# Patient Record
Sex: MALE | Race: WHITE | NOT HISPANIC OR LATINO | URBAN - METROPOLITAN AREA
[De-identification: names, ages, dates, MRNs, and addresses within clinical notes are randomized per-mention and may not be internally consistent; named-entity substitution may affect disease eponyms.]

---

## 2017-06-13 ENCOUNTER — OUTPATIENT (OUTPATIENT)
Dept: OUTPATIENT SERVICES | Facility: HOSPITAL | Age: 77
LOS: 1 days | End: 2017-06-13

## 2018-11-27 ENCOUNTER — OUTPATIENT (OUTPATIENT)
Dept: OUTPATIENT SERVICES | Facility: HOSPITAL | Age: 78
LOS: 1 days | End: 2018-11-27

## 2019-09-05 PROBLEM — Z00.00 ENCOUNTER FOR PREVENTIVE HEALTH EXAMINATION: Status: ACTIVE | Noted: 2019-09-05

## 2019-09-06 ENCOUNTER — APPOINTMENT (OUTPATIENT)
Dept: NEUROSURGERY | Facility: CLINIC | Age: 79
End: 2019-09-06
Payer: MEDICARE

## 2019-09-06 VITALS
HEIGHT: 70 IN | SYSTOLIC BLOOD PRESSURE: 129 MMHG | HEART RATE: 95 BPM | WEIGHT: 195 LBS | BODY MASS INDEX: 27.92 KG/M2 | DIASTOLIC BLOOD PRESSURE: 88 MMHG

## 2019-09-06 DIAGNOSIS — M54.16 RADICULOPATHY, LUMBAR REGION: ICD-10-CM

## 2019-09-06 DIAGNOSIS — I82.409 ACUTE EMBOLISM AND THROMBOSIS OF UNSPECIFIED DEEP VEINS OF UNSPECIFIED LOWER EXTREMITY: ICD-10-CM

## 2019-09-06 DIAGNOSIS — M48.062 SPINAL STENOSIS, LUMBAR REGION WITH NEUROGENIC CLAUDICATION: ICD-10-CM

## 2019-09-06 PROCEDURE — 99204 OFFICE O/P NEW MOD 45 MIN: CPT

## 2019-09-06 RX ORDER — RIVAROXABAN 20 MG/1
20 TABLET, FILM COATED ORAL
Refills: 0 | Status: ACTIVE | COMMUNITY

## 2019-09-06 RX ORDER — ROSUVASTATIN CALCIUM 10 MG/1
10 TABLET, FILM COATED ORAL
Refills: 0 | Status: ACTIVE | COMMUNITY

## 2019-09-06 RX ORDER — OXYCODONE AND ACETAMINOPHEN 10; 325 MG/1; MG/1
10-325 TABLET ORAL
Qty: 20 | Refills: 0 | Status: ACTIVE | COMMUNITY
Start: 2019-09-06 | End: 1900-01-01

## 2019-09-06 RX ORDER — LISINOPRIL 10 MG/1
10 TABLET ORAL
Refills: 0 | Status: ACTIVE | COMMUNITY

## 2019-09-06 NOTE — RESULTS/DATA
[FreeTextEntry1] : \par [facility] imaging reveals spondylolisthesis at L4-5 and stenosis L3-4-4 5 with mild/moderate stenosis at L2-3 and 3 for

## 2019-09-06 NOTE — PLAN
[FreeTextEntry1] : DIAGNOSIS:    LUMBAR SPONDYLOSIS/STENOSIS     DISK DEGENERATION\par TREATMENT PLAN:  NON-SURGICAL\par \par This is a patient with degenerated lumbar disks with associated stenosis and spondylosis.  I have recommended nonsurgical management at this time.  This consists of physical therapy and/or manual medicine in conjunction to medical therapy and other conservative methods.  These include the consideration of trigger point injections and or the utilization of modalities such as TENS where applicable.  The next tier would be the referral to a pain management specialist (anesthesia or physiatry) for the consideration of spinal injections.  This includes the options of epidural steroids, facet injections as well as other novel techniques that may provide pain relief.  Although there is indeed evidence of disk degeneration on imaging, discectomy or spinal fusion for the sole purposes of treating back/leg pain in the absence of a compressive lesion or neurological issue is associated with poor outcomes.   \par \par Additionally he is on anticoagulation, he has a history of valve replacement, and had a fairly adverse event with the steroid administration. I believe medically he would be a poor surgical candidate overall.\par \par I have reviewed the images in detail with the patient today in my office and have answered all questions regarding this condition to the best of my ability to the patient’s satisfaction.\par

## 2019-09-06 NOTE — REASON FOR VISIT
[New Patient Visit] : a new patient visit [FreeTextEntry1] : severe lower back pain- [facility].

## 2019-09-06 NOTE — HISTORY OF PRESENT ILLNESS
[de-identified] : \par Robert is a pleasant 79-year-old here for evaluation of his lumbar spine. States a history of severe pain starting in about May. Prior that he's been very active and he is still employed as a microscope technician. He traveled to [place] in January as a matter of fact by his report. At this point he has excruciating pain and is unable to walk without the use of a cane. He started with some therapy and ended pain management and green port which unfortunately only yielded him significant side effects increased pain. 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 Thereafter he tried to remain active working in February for a long car ride and ended up with a DVT in the right calf. He is currently on Zarelto.  He has a history of valve replacement in the distant past and is on the care of cardiology as well as vascular surgery.\par \par Mart Molina  PMD\par Oscar BELTRAN    did not help    Cushingoid response   Last   July\par [facility]\par [facility] \par \par \par \par RIGHT   DVT   \par Zarelto\par \par \par

## 2019-09-06 NOTE — PHYSICAL EXAM
[Antalgic] : antalgic [Able to toe walk] : the patient was not able to toe walk [Able to heel walk] : the patient was not able to heel walk [Intact] : all sensory within normal limits bilaterally

## 2020-10-08 ENCOUNTER — OUTPATIENT (OUTPATIENT)
Dept: OUTPATIENT SERVICES | Facility: HOSPITAL | Age: 80
LOS: 1 days | End: 2020-10-08

## 2021-03-05 ENCOUNTER — OUTPATIENT (OUTPATIENT)
Dept: OUTPATIENT SERVICES | Facility: HOSPITAL | Age: 81
LOS: 1 days | End: 2021-03-05

## 2022-12-06 ENCOUNTER — OFFICE (OUTPATIENT)
Dept: URBAN - METROPOLITAN AREA CLINIC 9 | Facility: CLINIC | Age: 82
Setting detail: OPHTHALMOLOGY
End: 2022-12-06
Payer: MEDICARE

## 2022-12-06 ENCOUNTER — RX ONLY (RX ONLY)
Age: 82
End: 2022-12-06

## 2022-12-06 DIAGNOSIS — H16.223: ICD-10-CM

## 2022-12-06 DIAGNOSIS — H43.813: ICD-10-CM

## 2022-12-06 DIAGNOSIS — Z96.1: ICD-10-CM

## 2022-12-06 DIAGNOSIS — L71.0: ICD-10-CM

## 2022-12-06 DIAGNOSIS — H01.004: ICD-10-CM

## 2022-12-06 DIAGNOSIS — H04.213: ICD-10-CM

## 2022-12-06 DIAGNOSIS — H02.132: ICD-10-CM

## 2022-12-06 DIAGNOSIS — H11.153: ICD-10-CM

## 2022-12-06 DIAGNOSIS — H26.493: ICD-10-CM

## 2022-12-06 DIAGNOSIS — H01.001: ICD-10-CM

## 2022-12-06 DIAGNOSIS — H02.135: ICD-10-CM

## 2022-12-06 PROCEDURE — 99214 OFFICE O/P EST MOD 30 MIN: CPT | Performed by: OPHTHALMOLOGY

## 2022-12-06 ASSESSMENT — REFRACTION_AUTOREFRACTION
OD_AXIS: 015
OS_SPHERE: -0.25
OD_SPHERE: PLANO
OS_AXIS: 145
OD_CYLINDER: +0.75
OS_CYLINDER: +1.75

## 2022-12-06 ASSESSMENT — LID POSITION - ECTROPION
OD_ECTROPION: RLL 1+
OS_ECTROPION: LLL 2+ 3+

## 2022-12-06 ASSESSMENT — TEAR BREAK UP TIME (TBUT)
OD_TBUT: 2-3 SECS
OS_TBUT: 2-3 SECS

## 2022-12-06 ASSESSMENT — REFRACTION_MANIFEST
OD_CYLINDER: +0.50
OU_VA: 20/15-2
OD_AXIS: 015
OD_VA2: 20/20
OS_ADD: +2.50
OD_SPHERE: PLANO
OD_ADD: +2.50
OS_SPHERE: PLANO
OD_VA1: 20/20
OD_VA2: 20/20
OU_VA: 20/15-2
OD_AXIS: 015
OS_ADD: +2.50
OS_CYLINDER: +0.50
OS_VA2: 20/20
OS_VA1: 20/20-
OD_SPHERE: PLANO
OS_CYLINDER: +0.50
OD_ADD: +2.50
OS_VA1: 20/20-
OS_VA2: 20/20
OD_VA1: 20/20
OS_AXIS: 145
OS_SPHERE: PLANO
OD_CYLINDER: +0.50
OS_AXIS: 145

## 2022-12-06 ASSESSMENT — KERATOMETRY
OD_AXISANGLE_DEGREES: 054
OS_K1POWER_DIOPTERS: 40.75
OS_K2POWER_DIOPTERS: 41.50
METHOD_AUTO_MANUAL: AUTO
OD_K1POWER_DIOPTERS: 40.50
OS_AXISANGLE_DEGREES: 114
OD_K2POWER_DIOPTERS: 40.75

## 2022-12-06 ASSESSMENT — REFRACTION_CURRENTRX
OS_CYLINDER: +0.50
OD_ADD: +2.50
OD_CYLINDER: +0.50
OS_AXIS: 167
OS_VPRISM_DIRECTION: PROGS
OS_ADD: +2.50
OS_SPHERE: +0.25
OD_SPHERE: +0.25
OD_VPRISM_DIRECTION: PROGS
OD_AXIS: 014
OD_OVR_VA: 20/
OS_OVR_VA: 20/

## 2022-12-06 ASSESSMENT — SPHEQUIV_DERIVED: OS_SPHEQUIV: 0.625

## 2022-12-06 ASSESSMENT — LID EXAM ASSESSMENTS
OS_BLEPHARITIS: LUL 2+
OD_BLEPHARITIS: RUL 2+

## 2022-12-06 ASSESSMENT — VISUAL ACUITY
OD_BCVA: 20/20
OS_BCVA: 20/25-2

## 2022-12-06 ASSESSMENT — AXIALLENGTH_DERIVED: OS_AL: 24.238

## 2022-12-06 ASSESSMENT — CONFRONTATIONAL VISUAL FIELD TEST (CVF)
OS_FINDINGS: FULL
OD_FINDINGS: FULL

## 2022-12-16 ENCOUNTER — RX ONLY (RX ONLY)
Age: 82
End: 2022-12-16

## 2022-12-16 ENCOUNTER — OFFICE (OUTPATIENT)
Dept: URBAN - METROPOLITAN AREA CLINIC 38 | Facility: CLINIC | Age: 82
Setting detail: OPHTHALMOLOGY
End: 2022-12-16
Payer: MEDICARE

## 2022-12-16 DIAGNOSIS — H04.223: ICD-10-CM

## 2022-12-16 DIAGNOSIS — H02.135: ICD-10-CM

## 2022-12-16 DIAGNOSIS — H02.132: ICD-10-CM

## 2022-12-16 PROBLEM — H35.3131 AGE RELATED MACULAR DEGENERATION DRY; BOTH EYES EARLY: Status: ACTIVE | Noted: 2022-12-06

## 2022-12-16 PROCEDURE — 92285 EXTERNAL OCULAR PHOTOGRAPHY: CPT | Performed by: OPHTHALMOLOGY

## 2022-12-16 PROCEDURE — 99214 OFFICE O/P EST MOD 30 MIN: CPT | Performed by: OPHTHALMOLOGY

## 2022-12-16 PROCEDURE — 68840 EXPLORE/IRRIGATE TEAR DUCTS: CPT | Performed by: OPHTHALMOLOGY

## 2022-12-16 ASSESSMENT — PUNCTA - ASSESSMENT
OD_PUNCTA: LARGE
OS_PUNCTA: LARGE

## 2022-12-16 ASSESSMENT — KERATOMETRY
OD_AXISANGLE_DEGREES: 054
OS_K1POWER_DIOPTERS: 40.75
OS_AXISANGLE_DEGREES: 114
OS_K2POWER_DIOPTERS: 41.50
OD_K2POWER_DIOPTERS: 40.75
METHOD_AUTO_MANUAL: AUTO
OD_K1POWER_DIOPTERS: 40.50

## 2022-12-16 ASSESSMENT — REFRACTION_AUTOREFRACTION
OD_AXIS: 015
OS_CYLINDER: +1.75
OD_SPHERE: PLANO
OD_CYLINDER: +0.75
OS_AXIS: 145
OS_SPHERE: -0.25

## 2022-12-16 ASSESSMENT — LID POSITION - ECTROPION
OS_ECTROPION: LLL 2+ 3+
OD_ECTROPION: RLL 1+

## 2022-12-16 ASSESSMENT — SPHEQUIV_DERIVED: OS_SPHEQUIV: 0.625

## 2022-12-16 ASSESSMENT — LACRIMAL DUCT - ASSESSMENT
OD_LACRIMAL_DUCT: PASSAGEWAY OPEN UPON IRRIGATION
OS_LACRIMAL_DUCT: PASSAGEWAY OPEN UPON IRRIGATION

## 2022-12-16 ASSESSMENT — TEAR BREAK UP TIME (TBUT)
OS_TBUT: 2-3 SECS
OD_TBUT: 2-3 SECS

## 2022-12-16 ASSESSMENT — LID EXAM ASSESSMENTS
OS_BLEPHARITIS: LUL 2+
OD_BLEPHARITIS: RUL 2+

## 2022-12-16 ASSESSMENT — CONFRONTATIONAL VISUAL FIELD TEST (CVF)
OD_FINDINGS: FULL
OS_FINDINGS: FULL

## 2022-12-16 ASSESSMENT — VISUAL ACUITY
OS_BCVA: 20/25-2
OD_BCVA: 20/20

## 2022-12-16 ASSESSMENT — AXIALLENGTH_DERIVED: OS_AL: 24.238

## 2023-01-17 ENCOUNTER — RX ONLY (RX ONLY)
Age: 83
End: 2023-01-17

## 2023-01-17 ENCOUNTER — OFFICE (OUTPATIENT)
Dept: URBAN - METROPOLITAN AREA CLINIC 9 | Facility: CLINIC | Age: 83
Setting detail: OPHTHALMOLOGY
End: 2023-01-17
Payer: MEDICARE

## 2023-01-17 DIAGNOSIS — H26.492: ICD-10-CM

## 2023-01-17 PROCEDURE — 66821 AFTER CATARACT LASER SURGERY: CPT | Performed by: OPHTHALMOLOGY

## 2023-01-17 ASSESSMENT — REFRACTION_AUTOREFRACTION
OS_CYLINDER: +1.75
OD_CYLINDER: +0.75
OS_AXIS: 145
OD_AXIS: 015
OD_SPHERE: PLANO
OS_SPHERE: -0.25

## 2023-01-17 ASSESSMENT — SPHEQUIV_DERIVED: OS_SPHEQUIV: 0.625

## 2023-01-17 ASSESSMENT — AXIALLENGTH_DERIVED: OS_AL: 24.238

## 2023-01-17 ASSESSMENT — KERATOMETRY
OS_K1POWER_DIOPTERS: 40.75
OS_K2POWER_DIOPTERS: 41.50
OD_AXISANGLE_DEGREES: 054
METHOD_AUTO_MANUAL: AUTO
OS_AXISANGLE_DEGREES: 114
OD_K2POWER_DIOPTERS: 40.75
OD_K1POWER_DIOPTERS: 40.50

## 2023-01-17 ASSESSMENT — VISUAL ACUITY
OD_BCVA: 20/20
OS_BCVA: 20/25-2

## 2023-01-17 ASSESSMENT — TEAR BREAK UP TIME (TBUT)
OD_TBUT: 2-3 SECS
OS_TBUT: 2-3 SECS

## 2023-01-17 ASSESSMENT — CONFRONTATIONAL VISUAL FIELD TEST (CVF)
OD_FINDINGS: FULL
OS_FINDINGS: FULL

## 2023-01-17 ASSESSMENT — TONOMETRY: OS_IOP_MMHG: 08

## 2023-01-31 ENCOUNTER — OFFICE (OUTPATIENT)
Dept: URBAN - METROPOLITAN AREA CLINIC 9 | Facility: CLINIC | Age: 83
Setting detail: OPHTHALMOLOGY
End: 2023-01-31
Payer: MEDICARE

## 2023-01-31 DIAGNOSIS — H26.491: ICD-10-CM

## 2023-01-31 DIAGNOSIS — H11.32: ICD-10-CM

## 2023-01-31 PROBLEM — H11.153 PINGUECULA;  ,, BOTH EYES: Status: ACTIVE | Noted: 2023-01-31

## 2023-01-31 PROCEDURE — 99024 POSTOP FOLLOW-UP VISIT: CPT | Performed by: OPHTHALMOLOGY

## 2023-01-31 ASSESSMENT — LID POSITION - ECTROPION
OS_ECTROPION: LLL 2+ 3+
OD_ECTROPION: RLL 1+

## 2023-01-31 ASSESSMENT — REFRACTION_AUTOREFRACTION
OS_SPHERE: -0.25
OD_AXIS: 015
OD_SPHERE: PLANO
OD_CYLINDER: +0.75
OS_CYLINDER: +1.75
OS_AXIS: 145

## 2023-01-31 ASSESSMENT — CONFRONTATIONAL VISUAL FIELD TEST (CVF)
OS_FINDINGS: FULL
OD_FINDINGS: FULL

## 2023-01-31 ASSESSMENT — TEAR BREAK UP TIME (TBUT)
OD_TBUT: 2-3 SECS
OS_TBUT: 2-3 SECS

## 2023-01-31 ASSESSMENT — LACRIMAL DUCT - ASSESSMENT
OS_LACRIMAL_DUCT: PASSAGEWAY OPEN UPON IRRIGATION
OD_LACRIMAL_DUCT: PASSAGEWAY OPEN UPON IRRIGATION

## 2023-01-31 ASSESSMENT — SPHEQUIV_DERIVED: OS_SPHEQUIV: 0.625

## 2023-01-31 ASSESSMENT — PUNCTA - ASSESSMENT
OD_PUNCTA: LARGE
OS_PUNCTA: LARGE

## 2023-01-31 ASSESSMENT — LID EXAM ASSESSMENTS
OS_BLEPHARITIS: LUL 2+
OD_BLEPHARITIS: RUL 2+

## 2025-02-27 PROBLEM — E11.3213: Status: ACTIVE | Noted: 2025-02-27
